# Patient Record
Sex: FEMALE | HISPANIC OR LATINO
[De-identification: names, ages, dates, MRNs, and addresses within clinical notes are randomized per-mention and may not be internally consistent; named-entity substitution may affect disease eponyms.]

---

## 2021-12-20 ENCOUNTER — NON-APPOINTMENT (OUTPATIENT)
Age: 56
End: 2021-12-20

## 2021-12-22 ENCOUNTER — NON-APPOINTMENT (OUTPATIENT)
Age: 56
End: 2021-12-22

## 2021-12-22 ENCOUNTER — APPOINTMENT (OUTPATIENT)
Dept: OPHTHALMOLOGY | Facility: CLINIC | Age: 56
End: 2021-12-22
Payer: COMMERCIAL

## 2021-12-22 PROCEDURE — 92285 EXTERNAL OCULAR PHOTOGRAPHY: CPT

## 2021-12-22 PROCEDURE — 92132 CPTRZD OPH DX IMG ANT SGM: CPT

## 2021-12-22 PROCEDURE — 92002 INTRM OPH EXAM NEW PATIENT: CPT

## 2023-02-03 ENCOUNTER — NON-APPOINTMENT (OUTPATIENT)
Age: 58
End: 2023-02-03

## 2023-02-06 ENCOUNTER — EMERGENCY (EMERGENCY)
Facility: HOSPITAL | Age: 58
LOS: 1 days | Discharge: HOME | End: 2023-02-06
Attending: STUDENT IN AN ORGANIZED HEALTH CARE EDUCATION/TRAINING PROGRAM
Payer: COMMERCIAL

## 2023-02-06 VITALS
RESPIRATION RATE: 18 BRPM | SYSTOLIC BLOOD PRESSURE: 135 MMHG | WEIGHT: 130.07 LBS | DIASTOLIC BLOOD PRESSURE: 61 MMHG | HEART RATE: 64 BPM | OXYGEN SATURATION: 98 % | HEIGHT: 66 IN | TEMPERATURE: 98 F

## 2023-02-06 VITALS
DIASTOLIC BLOOD PRESSURE: 72 MMHG | OXYGEN SATURATION: 97 % | TEMPERATURE: 98 F | RESPIRATION RATE: 17 BRPM | HEART RATE: 60 BPM | SYSTOLIC BLOOD PRESSURE: 110 MMHG

## 2023-02-06 LAB
ALBUMIN SERPL ELPH-MCNC: 4.4 G/DL — SIGNIFICANT CHANGE UP (ref 3.3–5)
ALP SERPL-CCNC: 98 U/L — SIGNIFICANT CHANGE UP (ref 40–120)
ALT FLD-CCNC: 25 U/L — SIGNIFICANT CHANGE UP (ref 10–45)
ANION GAP SERPL CALC-SCNC: 12 MMOL/L — SIGNIFICANT CHANGE UP (ref 5–17)
AST SERPL-CCNC: 27 U/L — SIGNIFICANT CHANGE UP (ref 10–40)
BASOPHILS # BLD AUTO: 0.05 K/UL — SIGNIFICANT CHANGE UP (ref 0–0.2)
BASOPHILS NFR BLD AUTO: 0.6 % — SIGNIFICANT CHANGE UP (ref 0–2)
BILIRUB SERPL-MCNC: 0.3 MG/DL — SIGNIFICANT CHANGE UP (ref 0.2–1.2)
BUN SERPL-MCNC: 15 MG/DL — SIGNIFICANT CHANGE UP (ref 7–23)
CALCIUM SERPL-MCNC: 9.9 MG/DL — SIGNIFICANT CHANGE UP (ref 8.4–10.5)
CHLORIDE SERPL-SCNC: 102 MMOL/L — SIGNIFICANT CHANGE UP (ref 96–108)
CO2 SERPL-SCNC: 25 MMOL/L — SIGNIFICANT CHANGE UP (ref 22–31)
CREAT SERPL-MCNC: 0.9 MG/DL — SIGNIFICANT CHANGE UP (ref 0.5–1.3)
EGFR: 74 ML/MIN/1.73M2 — SIGNIFICANT CHANGE UP
EOSINOPHIL # BLD AUTO: 0.09 K/UL — SIGNIFICANT CHANGE UP (ref 0–0.5)
EOSINOPHIL NFR BLD AUTO: 1.1 % — SIGNIFICANT CHANGE UP (ref 0–6)
GLUCOSE SERPL-MCNC: 112 MG/DL — HIGH (ref 70–99)
HCT VFR BLD CALC: 41.6 % — SIGNIFICANT CHANGE UP (ref 34.5–45)
HGB BLD-MCNC: 13.8 G/DL — SIGNIFICANT CHANGE UP (ref 11.5–15.5)
IMM GRANULOCYTES NFR BLD AUTO: 0.4 % — SIGNIFICANT CHANGE UP (ref 0–0.9)
LYMPHOCYTES # BLD AUTO: 1.86 K/UL — SIGNIFICANT CHANGE UP (ref 1–3.3)
LYMPHOCYTES # BLD AUTO: 22.4 % — SIGNIFICANT CHANGE UP (ref 13–44)
MAGNESIUM SERPL-MCNC: 2.4 MG/DL — SIGNIFICANT CHANGE UP (ref 1.6–2.6)
MCHC RBC-ENTMCNC: 29.7 PG — SIGNIFICANT CHANGE UP (ref 27–34)
MCHC RBC-ENTMCNC: 33.2 GM/DL — SIGNIFICANT CHANGE UP (ref 32–36)
MCV RBC AUTO: 89.5 FL — SIGNIFICANT CHANGE UP (ref 80–100)
MONOCYTES # BLD AUTO: 0.72 K/UL — SIGNIFICANT CHANGE UP (ref 0–0.9)
MONOCYTES NFR BLD AUTO: 8.7 % — SIGNIFICANT CHANGE UP (ref 2–14)
NEUTROPHILS # BLD AUTO: 5.55 K/UL — SIGNIFICANT CHANGE UP (ref 1.8–7.4)
NEUTROPHILS NFR BLD AUTO: 66.8 % — SIGNIFICANT CHANGE UP (ref 43–77)
NRBC # BLD: 0 /100 WBCS — SIGNIFICANT CHANGE UP (ref 0–0)
PHOSPHATE SERPL-MCNC: 4.3 MG/DL — SIGNIFICANT CHANGE UP (ref 2.5–4.5)
PLATELET # BLD AUTO: 321 K/UL — SIGNIFICANT CHANGE UP (ref 150–400)
POTASSIUM SERPL-MCNC: 4.4 MMOL/L — SIGNIFICANT CHANGE UP (ref 3.5–5.3)
POTASSIUM SERPL-SCNC: 4.4 MMOL/L — SIGNIFICANT CHANGE UP (ref 3.5–5.3)
PROT SERPL-MCNC: 7.2 G/DL — SIGNIFICANT CHANGE UP (ref 6–8.3)
RBC # BLD: 4.65 M/UL — SIGNIFICANT CHANGE UP (ref 3.8–5.2)
RBC # FLD: 12.4 % — SIGNIFICANT CHANGE UP (ref 10.3–14.5)
SODIUM SERPL-SCNC: 139 MMOL/L — SIGNIFICANT CHANGE UP (ref 135–145)
TROPONIN T, HIGH SENSITIVITY RESULT: 11 NG/L — SIGNIFICANT CHANGE UP (ref 0–51)
WBC # BLD: 8.3 K/UL — SIGNIFICANT CHANGE UP (ref 3.8–10.5)
WBC # FLD AUTO: 8.3 K/UL — SIGNIFICANT CHANGE UP (ref 3.8–10.5)

## 2023-02-06 PROCEDURE — 83735 ASSAY OF MAGNESIUM: CPT

## 2023-02-06 PROCEDURE — 84484 ASSAY OF TROPONIN QUANT: CPT

## 2023-02-06 PROCEDURE — 85025 COMPLETE CBC W/AUTO DIFF WBC: CPT

## 2023-02-06 PROCEDURE — 71046 X-RAY EXAM CHEST 2 VIEWS: CPT

## 2023-02-06 PROCEDURE — 71046 X-RAY EXAM CHEST 2 VIEWS: CPT | Mod: 26

## 2023-02-06 PROCEDURE — 84100 ASSAY OF PHOSPHORUS: CPT

## 2023-02-06 PROCEDURE — 93005 ELECTROCARDIOGRAM TRACING: CPT

## 2023-02-06 PROCEDURE — 84443 ASSAY THYROID STIM HORMONE: CPT

## 2023-02-06 PROCEDURE — 80053 COMPREHEN METABOLIC PANEL: CPT

## 2023-02-06 PROCEDURE — 99285 EMERGENCY DEPT VISIT HI MDM: CPT

## 2023-02-06 PROCEDURE — 99285 EMERGENCY DEPT VISIT HI MDM: CPT | Mod: 25

## 2023-02-06 RX ORDER — ASPIRIN/CALCIUM CARB/MAGNESIUM 324 MG
162 TABLET ORAL ONCE
Refills: 0 | Status: COMPLETED | OUTPATIENT
Start: 2023-02-06 | End: 2023-02-06

## 2023-02-06 RX ADMIN — Medication 162 MILLIGRAM(S): at 22:08

## 2023-02-06 NOTE — ED PROVIDER NOTE - NSFOLLOWUPINSTRUCTIONS_ED_ALL_ED_FT
You were seen in the emergency department for chest pain. Your workup in the emergency department includes bloodwork, ECG and chest xray. The presumed diagnosis is atypical chest pain. You can find the results of all the tests in this discharge packet.     Please follow up with your primary care doctor within 48 hours for continuation of care.   Please follow up with your GI doctor within 1 week for further management.   Please follow up with cardiology within a week for further management.     Return to the emergency department if you experience any new/concerning/worsening symptoms such as but not limited to: fever (>100.3F), intractable nausea, vomiting, shortness of breath, persistent chest pain.   ====================  Chest Pain    WHAT YOU NEED TO KNOW:  Chest pain can be caused by a range of conditions, from not serious to life-threatening. Chest pain can be a symptom of a digestive problem, such as acid reflux or a stomach ulcer. An anxiety attack or a strong emotion, such as anger, can also cause chest pain. Infection, inflammation, or a fracture in the bones or cartilage in your chest can cause pain or discomfort. Sometimes chest pain or pressure is caused by poor blood flow to your heart (angina). Chest pain may also be caused by life-threatening conditions such as a heart attack or blood clot in your lungs.     DISCHARGE INSTRUCTIONS:  Call 911 if:   - You have any of the following signs of a heart attack: Squeezing, pressure, or pain in your chest  - You may also have any of the following: Discomfort or pain in your back, neck, jaw, stomach, or arm  - Shortness of breath  - Nausea or vomiting  - Lightheadedness or a sudden cold sweat    Seek care immediately if:   - You have chest discomfort that gets worse, even with medicine.  - You cough or vomit blood.   - Your bowel movements are black or bloody.   - You cannot stop vomiting, or it hurts to swallow.     Contact your healthcare provider if:   - You have questions or concerns about your condition or care.    Medicines:   - Medicines may be given to treat the cause of your chest pain. Examples include pain medicine, anxiety medicine, or medicines to increase blood flow to your heart.   - Do not take certain medicines without asking your healthcare provider first. These include NSAIDs, herbal or vitamin supplements, or hormones (estrogen or progestin).   - Take your medicine as directed. Contact your healthcare provider if you think your medicine is not helping or if you have side effects. Tell him or her if you are allergic to any medicine. Keep a list of the medicines, vitamins, and herbs you take. Include the amounts, and when and why you take them. Bring the list or the pill bottles to follow-up visits. Carry your medicine list with you in case of an emergency.    Follow up with your healthcare provider within 72 hours, or as directed: You may need to return for more tests to find the cause of your chest pain. You may be referred to a specialist, such as a cardiologist or gastroenterologist. Write down your questions so you remember to ask them during your visits.     Healthy living tips: The following are general healthy guidelines. If your chest pain is caused by a heart problem, your healthcare provider will give you specific guidelines to follow.  - Do not smoke. Nicotine and other chemicals in cigarettes and cigars can cause lung and heart damage. Ask your healthcare provider for information if you currently smoke and need help to quit. E-cigarettes or smokeless tobacco still contain nicotine. Talk to your healthcare provider before you use these products.   - Eat a variety of healthy, low-fat, low-salt foods. Healthy foods include fruits, vegetables, whole-grain breads, low-fat dairy products, beans, lean meats, and fish. Ask for more information about a heart healthy diet.  - Drink plenty of water every day. Your body is made of mostly water. Water helps your body to control temperature and blood pressure. Ask your healthcare provider how much water you should drink every day.  - Ask about activity. Your healthcare provider will tell you which activities to limit or avoid. Ask when you can drive, return to work, and have sex. Ask about the best exercise plan for you.  - Maintain a healthy weight. Ask your healthcare provider how much you should weigh. Ask him or her to help you create a weight loss plan if you are overweight.   If you have a stent:   - Carry your stent card with you at all times.   - Let all healthcare providers know that you have a stent.     [facility]  General Internal Medicine  [address] [place] [address]  Phone: [phone]    [facility]  Internal Medicine  [address] [place] [address]  Phone: [phone]  Fax: [phone]    [facility]  Cardiology  [address] [place] [address]  Phone: [phone]

## 2023-02-06 NOTE — ED PROVIDER NOTE - OBJECTIVE STATEMENT
Patient is a 57 year-old-female with history of GERD presents with chest discomfort. Patient reports that she had 2x episodes of chest discomfort located in the midsternal area, lasted few seconds and self resolves. Also had an episode of upper extremity numbness which happened this morning when she woke up, which self resolved. Currently does not have chest pain/shortness of breath, numbness/weakness. Denies fever, chills, nausea, vomiting, abdominal pain, urinary/bowel complaints. Has an appointment with her PMD and GI on Thursday. Has been under a lot of stress lately due to moving.

## 2023-02-06 NOTE — ED PROVIDER NOTE - PATIENT PORTAL LINK FT
You can access the FollowMyHealth Patient Portal offered by [facility] by registering at the following website: [email or web address]. By joining [employer]’s FollowMyHealth portal, you will also be able to view your health information using other applications (apps) compatible with our system.

## 2023-02-06 NOTE — ED PROVIDER NOTE - NSFOLLOWUPCLINICS_GEN_ALL_ED_FT
[facility]  Cardiology  [address] [place] [address]  Phone: [phone]  Fax:

## 2023-02-06 NOTE — ED PROVIDER NOTE - PHYSICAL EXAMINATION
Vitals: I have reviewed the patients vital signs  General: Well dressed, well appearing, no acute distress  HEENT: Atraumatic, normocephalic, airway patent  Eyes: EOMI, tracking appropriately  Neck: no tracheal deviation, no JVD  Chest/Lungs: no trauma, symmetric chest rise, speaking in complete sentences, no WOB  Heart: skin and extremities well perfused, regular rate and rhythm  Abdomen: soft, nontender and nondistended   Neuro: A+Ox3, ambulating without difficulty, CN grossly intact  MSK: strength at baseline in all extremities, no muscle wasting or atrophy  Skin: no cyanosis, no jaundice, no new emergent lesions

## 2023-02-06 NOTE — ED ADULT NURSE NOTE - OBJECTIVE STATEMENT
PT is a 57 year old A&OX4 female with PMH of hypotension, HLD, GERD, and a cardiac murmur who presents to the ED from home with c/o chest pain and numbness/tingling. PT states she has been having intermittent episodes of numbness/tingling in her extremities, sometimes B/L, sometimes upper extremities, and sometimes lower extremities. PT was seen at Urgent Care on Saturday where they did labs, an EKG, and UA and PT was sent home and told to F/U with her PCP or go to the ED if the pain returned. PT presents today because the pain returned, but PT currently rates the pain a 0/10. PT also endorsing some SOB today. PT denies use of OTC pain relievers. PT is resting comfortably in bed, breathing unlabored on room air, and speaking in complete sentences. Abdomen is soft, non-tender, and non-distended. Skin is warm and dry, no diaphoresis noted. No edema noted to B/L extremities. Strong strength in B/L extremities, sensation intact. IV access established 20G in right AC by QRN. PT ambulatory with steady gait. Safety and comfort maintained. Family at the bedside.

## 2023-02-06 NOTE — ED PROVIDER NOTE - CLINICAL SUMMARY MEDICAL DECISION MAKING FREE TEXT BOX
Patient is a 57 year-old-female with history of GERD presents with chest discomfort. Likely atypical chest pain vs. GERD vs stress related. Patient is well appearing with normal vitals. Currently asymptomatic. Patient is Q'doc'd with CBC/CMP/trop/TSH/ECG/CXR. ECG is non-ischemic with no ST/T wave changes. Unremarkable CBC/CMP, trop is negative in the setting of no chest pain. CXR unremarkable. Will discharge with cardiology follow up. Patient is a 57 year-old-female with history of GERD presents with chest discomfort. Likely atypical chest pain vs. GERD vs stress related. Patient is well appearing with normal vitals. Currently asymptomatic. Patient is Q'doc'd with CBC/CMP/trop/TSH/ECG/CXR. ECG is non-ischemic with no ST/T wave changes. Unremarkable CBC/CMP, trop is negative in the setting of no chest pain. CXR unremarkable. Will discharge with cardiology follow up.    Carlo Hampton MD (Attending Physician): I agree with above MDM. Patient well appearing. I discussed the results and answered all questions. Patient is amenable to plan of being discharged home with follow up with cardiology. Strict ED return precautions given

## 2023-02-06 NOTE — ED PROVIDER NOTE - ATTENDING CONTRIBUTION TO CARE
I, Carlo Hampton, performed a history and physical exam of the patient and discussed their management with the resident, student, and/or fellow. I reviewed the note and agree with the documented findings and plan of care. I was present and available for all procedures.

## 2023-02-06 NOTE — ED ADULT TRIAGE NOTE - CHIEF COMPLAINT QUOTE
intermittent chest pain & fatigue starting this weekend  was seen @  saturday with normal EKG and the pain restarted today

## 2023-02-07 LAB — TSH SERPL-MCNC: 1.18 UIU/ML — SIGNIFICANT CHANGE UP (ref 0.27–4.2)

## 2023-08-22 ENCOUNTER — NON-APPOINTMENT (OUTPATIENT)
Age: 58
End: 2023-08-22

## 2023-08-27 ENCOUNTER — EMERGENCY (EMERGENCY)
Facility: HOSPITAL | Age: 58
LOS: 1 days | Discharge: HOME | End: 2023-08-27
Attending: STUDENT IN AN ORGANIZED HEALTH CARE EDUCATION/TRAINING PROGRAM
Payer: COMMERCIAL

## 2023-08-27 VITALS
SYSTOLIC BLOOD PRESSURE: 105 MMHG | RESPIRATION RATE: 16 BRPM | HEART RATE: 60 BPM | DIASTOLIC BLOOD PRESSURE: 62 MMHG | TEMPERATURE: 98 F | OXYGEN SATURATION: 96 %

## 2023-08-27 VITALS
TEMPERATURE: 99 F | DIASTOLIC BLOOD PRESSURE: 65 MMHG | SYSTOLIC BLOOD PRESSURE: 98 MMHG | WEIGHT: 132.06 LBS | RESPIRATION RATE: 16 BRPM | HEIGHT: 66 IN | OXYGEN SATURATION: 96 % | HEART RATE: 64 BPM

## 2023-08-27 LAB
ALBUMIN SERPL ELPH-MCNC: 3.6 G/DL — SIGNIFICANT CHANGE UP (ref 3.5–5)
ALP SERPL-CCNC: 118 U/L — SIGNIFICANT CHANGE UP (ref 40–120)
ALT FLD-CCNC: 24 U/L DA — SIGNIFICANT CHANGE UP (ref 10–60)
ANION GAP SERPL CALC-SCNC: 5 MMOL/L — SIGNIFICANT CHANGE UP (ref 5–17)
APTT BLD: 36.4 SEC — HIGH (ref 24.5–35.6)
AST SERPL-CCNC: 17 U/L — SIGNIFICANT CHANGE UP (ref 10–40)
BASOPHILS # BLD AUTO: 0.04 K/UL — SIGNIFICANT CHANGE UP (ref 0–0.2)
BASOPHILS NFR BLD AUTO: 0.5 % — SIGNIFICANT CHANGE UP (ref 0–2)
BILIRUB SERPL-MCNC: 0.6 MG/DL — SIGNIFICANT CHANGE UP (ref 0.2–1.2)
BUN SERPL-MCNC: 18 MG/DL — SIGNIFICANT CHANGE UP (ref 7–18)
CALCIUM SERPL-MCNC: 9.2 MG/DL — SIGNIFICANT CHANGE UP (ref 8.4–10.5)
CHLORIDE SERPL-SCNC: 104 MMOL/L — SIGNIFICANT CHANGE UP (ref 96–108)
CO2 SERPL-SCNC: 28 MMOL/L — SIGNIFICANT CHANGE UP (ref 22–31)
CREAT SERPL-MCNC: 0.78 MG/DL — SIGNIFICANT CHANGE UP (ref 0.5–1.3)
EGFR: 88 ML/MIN/1.73M2 — SIGNIFICANT CHANGE UP
EOSINOPHIL # BLD AUTO: 0.02 K/UL — SIGNIFICANT CHANGE UP (ref 0–0.5)
EOSINOPHIL NFR BLD AUTO: 0.3 % — SIGNIFICANT CHANGE UP (ref 0–6)
GLUCOSE SERPL-MCNC: 126 MG/DL — HIGH (ref 70–99)
HCT VFR BLD CALC: 40.9 % — SIGNIFICANT CHANGE UP (ref 34.5–45)
HGB BLD-MCNC: 12.9 G/DL — SIGNIFICANT CHANGE UP (ref 11.5–15.5)
IMM GRANULOCYTES NFR BLD AUTO: 0.5 % — SIGNIFICANT CHANGE UP (ref 0–0.9)
INR BLD: 0.95 RATIO — SIGNIFICANT CHANGE UP (ref 0.85–1.18)
LIDOCAIN IGE QN: 25 U/L — SIGNIFICANT CHANGE UP (ref 13–75)
LYMPHOCYTES # BLD AUTO: 0.54 K/UL — LOW (ref 1–3.3)
LYMPHOCYTES # BLD AUTO: 6.9 % — LOW (ref 13–44)
MCHC RBC-ENTMCNC: 28.9 PG — SIGNIFICANT CHANGE UP (ref 27–34)
MCHC RBC-ENTMCNC: 31.5 GM/DL — LOW (ref 32–36)
MCV RBC AUTO: 91.7 FL — SIGNIFICANT CHANGE UP (ref 80–100)
MONOCYTES # BLD AUTO: 0.83 K/UL — SIGNIFICANT CHANGE UP (ref 0–0.9)
MONOCYTES NFR BLD AUTO: 10.6 % — SIGNIFICANT CHANGE UP (ref 2–14)
NEUTROPHILS # BLD AUTO: 6.37 K/UL — SIGNIFICANT CHANGE UP (ref 1.8–7.4)
NEUTROPHILS NFR BLD AUTO: 81.2 % — HIGH (ref 43–77)
NRBC # BLD: 0 /100 WBCS — SIGNIFICANT CHANGE UP (ref 0–0)
PLATELET # BLD AUTO: 271 K/UL — SIGNIFICANT CHANGE UP (ref 150–400)
POTASSIUM SERPL-MCNC: 4.1 MMOL/L — SIGNIFICANT CHANGE UP (ref 3.5–5.3)
POTASSIUM SERPL-SCNC: 4.1 MMOL/L — SIGNIFICANT CHANGE UP (ref 3.5–5.3)
PROT SERPL-MCNC: 7.2 G/DL — SIGNIFICANT CHANGE UP (ref 6–8.3)
PROTHROM AB SERPL-ACNC: 10.8 SEC — SIGNIFICANT CHANGE UP (ref 9.5–13)
RAPID RVP RESULT: DETECTED
RBC # BLD: 4.46 M/UL — SIGNIFICANT CHANGE UP (ref 3.8–5.2)
RBC # FLD: 12.6 % — SIGNIFICANT CHANGE UP (ref 10.3–14.5)
SARS-COV-2 RNA SPEC QL NAA+PROBE: DETECTED
SODIUM SERPL-SCNC: 137 MMOL/L — SIGNIFICANT CHANGE UP (ref 135–145)
WBC # BLD: 7.84 K/UL — SIGNIFICANT CHANGE UP (ref 3.8–10.5)
WBC # FLD AUTO: 7.84 K/UL — SIGNIFICANT CHANGE UP (ref 3.8–10.5)

## 2023-08-27 PROCEDURE — 0225U NFCT DS DNA&RNA 21 SARSCOV2: CPT

## 2023-08-27 PROCEDURE — 85730 THROMBOPLASTIN TIME PARTIAL: CPT

## 2023-08-27 PROCEDURE — 96375 TX/PRO/DX INJ NEW DRUG ADDON: CPT

## 2023-08-27 PROCEDURE — 80053 COMPREHEN METABOLIC PANEL: CPT

## 2023-08-27 PROCEDURE — 99284 EMERGENCY DEPT VISIT MOD MDM: CPT

## 2023-08-27 PROCEDURE — 36415 COLL VENOUS BLD VENIPUNCTURE: CPT

## 2023-08-27 PROCEDURE — 99284 EMERGENCY DEPT VISIT MOD MDM: CPT | Mod: 25

## 2023-08-27 PROCEDURE — 96374 THER/PROPH/DIAG INJ IV PUSH: CPT

## 2023-08-27 PROCEDURE — 85025 COMPLETE CBC W/AUTO DIFF WBC: CPT

## 2023-08-27 PROCEDURE — 85610 PROTHROMBIN TIME: CPT

## 2023-08-27 PROCEDURE — 99053 MED SERV 10PM-8AM 24 HR FAC: CPT

## 2023-08-27 PROCEDURE — 83690 ASSAY OF LIPASE: CPT

## 2023-08-27 RX ORDER — KETOROLAC TROMETHAMINE 30 MG/ML
30 SYRINGE (ML) INJECTION ONCE
Refills: 0 | Status: DISCONTINUED | OUTPATIENT
Start: 2023-08-27 | End: 2023-08-27

## 2023-08-27 RX ORDER — METOCLOPRAMIDE HCL 10 MG
10 TABLET ORAL ONCE
Refills: 0 | Status: COMPLETED | OUTPATIENT
Start: 2023-08-27 | End: 2023-08-27

## 2023-08-27 RX ORDER — IBUPROFEN 200 MG
1 TABLET ORAL
Qty: 20 | Refills: 0
Start: 2023-08-27

## 2023-08-27 RX ORDER — SODIUM CHLORIDE 9 MG/ML
1000 INJECTION INTRAMUSCULAR; INTRAVENOUS; SUBCUTANEOUS ONCE
Refills: 0 | Status: COMPLETED | OUTPATIENT
Start: 2023-08-27 | End: 2023-08-27

## 2023-08-27 RX ORDER — FLUTICASONE PROPIONATE 50 MCG
1 SPRAY, SUSPENSION NASAL
Qty: 1 | Refills: 0
Start: 2023-08-27 | End: 2023-08-29

## 2023-08-27 RX ADMIN — Medication 30 MILLIGRAM(S): at 04:14

## 2023-08-27 RX ADMIN — SODIUM CHLORIDE 1000 MILLILITER(S): 9 INJECTION INTRAMUSCULAR; INTRAVENOUS; SUBCUTANEOUS at 04:14

## 2023-08-27 RX ADMIN — Medication 10 MILLIGRAM(S): at 04:14

## 2023-08-27 NOTE — ED PROVIDER NOTE - OBJECTIVE STATEMENT
57 y.o presenting with left eye redness x 1 month. patient on abx drop by ophtho. no change in vision, pain on eye movement, double vision. today noting sore throat, nausea, congestion and sinus pressure.

## 2023-08-27 NOTE — ED PROVIDER NOTE - CLINICAL SUMMARY MEDICAL DECISION MAKING FREE TEXT BOX
PAtient presenting for congestion, sinus pressure and sore. clinically appears to be viral illness. will obtain lab, symptomatic treatment and reassess. patient eye complaint present x 1 month, no acute changes, clinically stable for ophtho f.u. patient already on abx drop. no vision change or signs post-septal involvement

## 2023-08-27 NOTE — ED PROVIDER NOTE - NSFOLLOWUPCLINICS_GEN_ALL_ED_FT
[facility] - ENT  Otolaryngology (ENT)  [address] [place] [address]  Phone: [phone]  Fax:     [facility]  Ophthalmology  [address] [place] [address]  Phone: [phone]  Fax:

## 2023-08-27 NOTE — ED PROVIDER NOTE - PATIENT PORTAL LINK FT
You can access the FollowMyHealth Patient Portal offered by [facility] by registering at the following website: [email or web address]. By joining [employer]’s FollowMyHealth portal, you will also be able to view your health information using other applications (apps) compatible with our system.

## 2023-08-27 NOTE — ED PROVIDER NOTE - PROGRESS NOTE DETAILS
PAtient lab wnl. clinically well. no signs of distress. vital stable. given return precautions and instructed to fu pmd. given med for symptoms treatment. ent and ophtho f.u info provided

## 2023-08-27 NOTE — ED ADULT TRIAGE NOTE - CHIEF COMPLAINT QUOTE
c/o infection to left side of my left eye x 1 months already ,sore throat, pressure to sinus area , diarrhea

## 2024-05-04 ENCOUNTER — NON-APPOINTMENT (OUTPATIENT)
Age: 59
End: 2024-05-04

## 2024-05-05 ENCOUNTER — NON-APPOINTMENT (OUTPATIENT)
Age: 59
End: 2024-05-05